# Patient Record
(demographics unavailable — no encounter records)

---

## 2024-11-25 NOTE — ASSESSMENT
[FreeTextEntry1] : # Type 2 diabetes mellitus - Last hemoglobin A1C was 8.0% - Fasting glucose is now within target range since switching to Janumet. Advised on more frequent fingerstick testing to identify hyperglycemia.  - She denies experiencing any hypoglycemia episodes. Reviewed hypoglycemia management. Not willing to switch glyburide for alternative medication at this point.  - Continue Janumet 50-1,000 mg twice daily and Glyburide 5 mg daily.  - Doesn't want to add Jardiance 10 mg daily now as she feels her glucose levels have improved. I advised her to continue testing. Plan is to discuss again during next visit and if A1C remains elevated, rediscussing adding this to her regimen.  - Microvascular complications:     > Neuropathy: Monofilament: within normal limits (9/16/24)     > Nephropathy: Albumin/creatinine ratio within normal limits (9/16/24).     > Retinopathy: Previously referred to ophthalmology.   # Hyperlipidemia - Continue rosuvastatin 20 mg daily.  - Check lipid profile during next visit.   # Osteoporosis screening - Previously had a DXA scan which was reportedly within normal limits.  - I advised her again to fax the results of this test to me for review.   RTC 2 months

## 2024-11-25 NOTE — REVIEW OF SYSTEMS
[Recent Weight Gain (___ Lbs)] : recent weight gain: [unfilled] lbs [Fatigue] : no fatigue [Recent Weight Loss (___ Lbs)] : no recent weight loss [Fever] : no fever [Chills] : no chills [Chest Pain] : no chest pain [Shortness Of Breath] : no shortness of breath [Nausea] : no nausea [Constipation] : no constipation [Vomiting] : no vomiting [Diarrhea] : no diarrhea

## 2024-11-25 NOTE — PHYSICAL EXAM
[Alert] : alert [Well Nourished] : well nourished [No Acute Distress] : no acute distress [Well Developed] : well developed [Normal Sclera/Conjunctiva] : normal sclera/conjunctiva [EOMI] : extra ocular movement intact [No Proptosis] : no proptosis [Thyroid Not Enlarged] : the thyroid was not enlarged [No Thyroid Nodules] : no palpable thyroid nodules [No Respiratory Distress] : no respiratory distress [No Accessory Muscle Use] : no accessory muscle use [Clear to Auscultation] : lungs were clear to auscultation bilaterally [Normal S1, S2] : normal S1 and S2 [Normal Rate] : heart rate was normal [Regular Rhythm] : with a regular rhythm [No Edema] : no peripheral edema [Pedal Pulses Normal] : the pedal pulses are present [Normal Bowel Sounds] : normal bowel sounds [Not Tender] : non-tender [Not Distended] : not distended [Soft] : abdomen soft [No Stigmata of Cushings Syndrome] : no stigmata of Cushings Syndrome [Normal Gait] : normal gait [Normal Strength/Tone] : muscle strength and tone were normal [No Rash] : no rash [Oriented x3] : oriented to person, place, and time

## 2024-11-25 NOTE — HISTORY OF PRESENT ILLNESS
[FreeTextEntry1] : Ms. Cavanaugh is a 65-year-old with uncontrolled type 2 diabetes mellitus who presents to the clinic for follow up.   DIABETES HISTORY - Age at diagnosis: 57-years-old. - Symptoms at time of diagnosis: Blurred vision. - Current Therapy: Janumet 50-1,000 mg twice daily (only taking once per day) and Glyburide 5 mg daily. - History of other regimens: Metformin (switched to Janumet).  - History of hypoglycemia: Denies. Reports having hypoglycemia awareness and starts feeling shaky and nervous when her glucose is around ~80 mg/dL. - History of DKA/HHS: Denies. - Complications: Last dilated eye exam was a few years ago, can't remember, reportedly without any retinopathy. She has numbness/tingling just on her right hand, unlikely to be related to diabetic neuropathy.  - Home FSG: Her A1C has been between 7-8%, but the last one on 24 was around 9%.        > Fastin-160 mg/dL.       > Before meals: Doesn't check.       > Bedtime: Doesn't check. - Diet: The doctor told her to stop rice + potatoes + bread. She stopped eating these things on 2024.        > Breakfast: She is now having coffee with milk and sugar (1 teaspoon). She used to eat two slices of whole wheat bread + ham + cheese.       > Lunch: Molondron + beef OR Cabbage OR Tortilla made with mozzarella cheese + tomatoes + Eggs OR Beef + boiled chayote. She used to eat rice (1/4) + chicken + salad.        > Dinner: Skips or cabbage. She used to eat plantain (1) + 1 piece of yuca + egg or salami.        > Snacks: Nuts OR green apple. No juice or soda.  - Physical activity: She is on a running program, goes running on  with a group. She also tries to track her steps and has been doing ~8,000 steps per day in average.  24 - During her last visit, we discussed increasing her dose of metformin as her A1C was mildly elevated at 7.4%, with occasional fasting glucose levels above target. The patient reports she never made this change despite recommendations; however, she did start taking rosuvastatin as advised. Approximately three weeks ago, she was seen by her primary doctor, who reportedly measured an A1C level that had increased to 9%. Consequently, her medication regimen was modified, with metformin being switched to Janumet 50-1,000 mg twice daily and Jardiance 10 mg daily. Glyburide was continued at the same dose of 5 mg daily. However, she admits to only taking the Janumet once per day and never starting Jardiance. She has continued taking Glyburide as prescribed. Her fasting glucose levels have been around 106-118 mg/dL, and she denies experiencing any hypoglycemic episodes. Her A1C today is 8.0%.  DIET: - Breakfast: Omelet made with cabbage, carrots, zucchini, 1 egg, coffee with milk (1 spoon of regular sugar).  - Lunch (2-3 PM): Rice (a fist) with meat and salad. - Dinner: Soda crackers x5 with peanut butter or cheese OR sweet potato with cheese.  - Snacks: Tangerine. - Soda / Juice: Denies. - Sweets: Denies.